# Patient Record
Sex: FEMALE | Race: WHITE | NOT HISPANIC OR LATINO | Employment: OTHER | URBAN - METROPOLITAN AREA
[De-identification: names, ages, dates, MRNs, and addresses within clinical notes are randomized per-mention and may not be internally consistent; named-entity substitution may affect disease eponyms.]

---

## 2018-08-08 ENCOUNTER — OFFICE VISIT (OUTPATIENT)
Dept: NEUROSURGERY | Facility: CLINIC | Age: 73
End: 2018-08-08

## 2018-08-08 VITALS — BODY MASS INDEX: 25.16 KG/M2 | HEIGHT: 65 IN | WEIGHT: 151 LBS | TEMPERATURE: 97.4 F

## 2018-08-08 DIAGNOSIS — M47.27 LUMBOSACRAL SPONDYLOSIS WITH RADICULOPATHY: Primary | ICD-10-CM

## 2018-08-08 DIAGNOSIS — M41.20 SCOLIOSIS (AND KYPHOSCOLIOSIS), IDIOPATHIC: ICD-10-CM

## 2018-08-08 DIAGNOSIS — M43.10 ACQUIRED SPONDYLOLISTHESIS: ICD-10-CM

## 2018-08-08 DIAGNOSIS — M48.061 SPINAL STENOSIS OF LUMBAR REGION WITHOUT NEUROGENIC CLAUDICATION: ICD-10-CM

## 2018-08-08 DIAGNOSIS — M51.36 DDD (DEGENERATIVE DISC DISEASE), LUMBAR: ICD-10-CM

## 2018-08-08 PROCEDURE — 99203 OFFICE O/P NEW LOW 30 MIN: CPT | Performed by: NEUROLOGICAL SURGERY

## 2018-08-08 RX ORDER — MIRABEGRON 50 MG/1
TABLET, FILM COATED, EXTENDED RELEASE ORAL
COMMUNITY
Start: 2018-08-02 | End: 2022-03-29

## 2018-08-08 RX ORDER — PREGABALIN 75 MG/1
75 CAPSULE ORAL
COMMUNITY

## 2018-08-08 RX ORDER — EXEMESTANE 25 MG/1
TABLET ORAL DAILY
COMMUNITY
End: 2022-03-29

## 2018-08-08 RX ORDER — ASPIRIN 325 MG
325 TABLET ORAL DAILY
COMMUNITY

## 2018-08-08 RX ORDER — METHOCARBAMOL 750 MG/1
TABLET, FILM COATED ORAL
COMMUNITY
Start: 2018-07-16

## 2018-08-08 RX ORDER — MONTELUKAST SODIUM 10 MG/1
TABLET ORAL
COMMUNITY
Start: 2018-06-04 | End: 2022-03-29

## 2018-08-08 RX ORDER — NAPROXEN 500 MG/1
TABLET ORAL
COMMUNITY
Start: 2018-07-31

## 2018-08-08 NOTE — PROGRESS NOTES
Patient: Yanely Hodge  : 1945    Primary Care Provider: Jah De Paz MD    Requesting Provider: As above        History    Chief Complaint: Low back and right anterior thigh pain.    History of Present Illness: Ms. Hodge is a 72-year-old retired  who has had chronic low back pain.  Her parents both had back problems.  About 5 years ago she saw Aden Cutler at the [facility] and he recommended nonoperative measures and started her on Lyrica which she used for a while.  In May her symptoms flared up once again.  She denies any specific inciting event.  The pain extends from her back into the right anterior thigh.  It does not extend below the knee.  She's better with walking or lying down.  Ice is helpful.  She is worse with sitting or standing.  She's been doing physical therapy and that has helped.  She's had 2 spinal injections and an SI block.  The first lumbar injection helped.  Other injections did not help.  She has no left leg symptoms.  She denies bowel or bladder dysfunction.  She is back on her Lyrica twice a day.  Too much of that is poorly tolerated.  She is also taking naproxen twice a day.  Overall her symptoms have improved somewhat.    Review of Systems   Constitutional: Negative for activity change, appetite change, chills, diaphoresis, fatigue, fever and unexpected weight change.   HENT: Negative for congestion, dental problem, drooling, ear discharge, ear pain, facial swelling, hearing loss, mouth sores, nosebleeds, postnasal drip, rhinorrhea, sinus pressure, sneezing, sore throat, tinnitus, trouble swallowing and voice change.    Eyes: Negative for photophobia, pain, discharge, redness, itching and visual disturbance.   Respiratory: Negative for apnea, cough, choking, chest tightness, shortness of breath, wheezing and stridor.    Cardiovascular: Negative for chest pain, palpitations and leg swelling.   Gastrointestinal: Negative for abdominal  "distention, abdominal pain, anal bleeding, blood in stool, constipation, diarrhea, nausea, rectal pain and vomiting.   Musculoskeletal: Positive for back pain and gait problem. Negative for arthralgias, joint swelling, myalgias, neck pain and neck stiffness.   Skin: Negative for color change, pallor, rash and wound.   Allergic/Immunologic: Positive for environmental allergies. Negative for food allergies and immunocompromised state.   Neurological: Positive for headaches. Negative for dizziness, tremors, seizures, syncope, facial asymmetry, speech difficulty, weakness, light-headedness and numbness.   Hematological: Negative for adenopathy. Does not bruise/bleed easily.   Psychiatric/Behavioral: Negative for agitation, behavioral problems, confusion, decreased concentration, dysphoric mood, self-injury, sleep disturbance and suicidal ideas. The patient is not nervous/anxious and is not hyperactive.        The patient's past medical history, past surgical history, family history, and social history have been reviewed at length in the electronic medical record.    Physical Exam:   Temp 97.4 °F (36.3 °C)   Ht 165.1 cm (65\")   Wt 68.5 kg (151 lb)   BMI 25.13 kg/m²   CONSTITUTIONAL: Patient is well-nourished, pleasant and appears stated age.  CV: Heart regular rate and rhythm without murmur, rub, or gallop.  PULMONARY: Lungs are clear to ascultation.  MUSCULOSKELETAL:  Straight leg raising is negative.  Clark's Sign is negative.  ROM in back is normal.  Tenderness in the back to palpation is not observed.  NEUROLOGICAL:  Orientation, memory, attention span, language function, and cognition have been examined and are intact.  Strength is intact in the lower extremities to direct testing.  Muscle tone is normal throughout.  Station and gait are normal.  Sensation is intact to light touch testing throughout.  Deep tendon reflexes are 1+ and symmetrical.  Coordination is intact.      Medical Decision Making    Data " Review:   MRI of the lumbar spine dated 7/24/18 demonstrates lumbar scoliosis as well as diffuse multilevel degenerative disc disease.  There is recess narrowing on the left at L2-3.  There is bilateral recess narrowing at L3-4.  There is generous stenosis at L4-5 where there is a grade 1 listhesis.    Diagnosis:   The patient harbors some mechanical low back pain.  She has a spondylolisthesis and deformity.  It's possible that she has some degree of radiculopathy.  Despite having radiographic stenosis she does not really describe symptoms consistent with neurogenic claudication.    Treatment Options:   The patient is improving and I suggested that she continue her medicine and her therapy.  I would like to give this time.  She will follow-up in my clinic in about 6 weeks to check on her progress.  She is avid rider and she will hold off on that for now.       Diagnosis Plan   1. Lumbosacral spondylosis with radiculopathy     2. Spinal stenosis of lumbar region without neurogenic claudication     3. DDD (degenerative disc disease), lumbar     4. Acquired spondylolisthesis     5. Scoliosis (and kyphoscoliosis), idiopathic             I, Dr. Betancourt, personally performed the services described in the documentation, as scribed in my presence, and it is both accurate and complete.  Scribed for Barry Betancourt MD by Hunter Talavera CMA on 08/08/2018 at 10:40 AM

## 2022-03-29 ENCOUNTER — OFFICE VISIT (OUTPATIENT)
Dept: NEUROSURGERY | Facility: CLINIC | Age: 77
End: 2022-03-29

## 2022-03-29 VITALS — BODY MASS INDEX: 24.86 KG/M2 | HEIGHT: 67 IN | TEMPERATURE: 98.3 F | WEIGHT: 158.4 LBS

## 2022-03-29 DIAGNOSIS — Q76.49 SPINAL DEFORMITY: ICD-10-CM

## 2022-03-29 DIAGNOSIS — M51.36 DDD (DEGENERATIVE DISC DISEASE), LUMBAR: ICD-10-CM

## 2022-03-29 DIAGNOSIS — M54.9 MECHANICAL BACK PAIN: Primary | ICD-10-CM

## 2022-03-29 PROCEDURE — 99204 OFFICE O/P NEW MOD 45 MIN: CPT | Performed by: NEUROLOGICAL SURGERY

## 2022-03-29 RX ORDER — PREGABALIN 50 MG/1
50 CAPSULE ORAL 3 TIMES DAILY
Qty: 30 CAPSULE | Refills: 0 | Status: SHIPPED | OUTPATIENT
Start: 2022-03-29

## 2022-03-29 RX ORDER — PRASTERONE (DHEA) 25 MG
CAPSULE ORAL
COMMUNITY

## 2022-03-29 RX ORDER — METHENAMINE HIPPURATE 1000 MG/1
TABLET ORAL EVERY 12 HOURS SCHEDULED
COMMUNITY

## 2022-03-29 RX ORDER — ESOMEPRAZOLE MAGNESIUM 40 MG/1
CAPSULE, DELAYED RELEASE ORAL
COMMUNITY
Start: 2022-03-06

## 2022-03-29 RX ORDER — MULTIVIT WITH MINERALS/LUTEIN
TABLET ORAL
COMMUNITY

## 2022-03-29 RX ORDER — KETOCONAZOLE 20 MG/ML
SHAMPOO TOPICAL
COMMUNITY

## 2022-03-29 RX ORDER — FLUTICASONE PROPIONATE 50 MCG
2 SPRAY, SUSPENSION (ML) NASAL DAILY
COMMUNITY
Start: 2022-01-17

## 2022-03-29 NOTE — PROGRESS NOTES
Patient: Yanely Hodge  : 1945    Primary Care Provider: Jah De Paz MD    Requesting Provider: As above        History    Chief Complaint: Right lower back pain.    History of Present Illness: Ms. Hodge is a 76-year-old retired  who is known to my service.  I last saw her in 2018 with back difficulties.  She has had chronic back issues.  Years ago she was seen at the [facility] by Aden Culter and nonoperative measures were recommended.  I also recommended nonoperative measures.  About 6 weeks ago she developed a flareup with right lower back pain and spasm.  She has been on Lyrica for 2 weeks and that has been very helpful.  She is also used Aleve and Tylenol.  She has no radicular leg symptoms.  She has a hard time tolerating the midday dose of Lyrica.  She is taking 100 mg twice a day at this point.    Review of Systems   Constitutional: Negative for activity change, appetite change, chills, diaphoresis, fatigue, fever and unexpected weight change.   HENT: Negative for congestion, dental problem, drooling, ear discharge, ear pain, facial swelling, hearing loss, mouth sores, nosebleeds, postnasal drip, rhinorrhea, sinus pressure, sinus pain, sneezing, sore throat, tinnitus, trouble swallowing and voice change.    Eyes: Negative for photophobia, pain, discharge, redness, itching and visual disturbance.   Respiratory: Negative for apnea, cough, choking, chest tightness, shortness of breath, wheezing and stridor.    Cardiovascular: Negative for chest pain, palpitations and leg swelling.   Gastrointestinal: Negative for abdominal distention, abdominal pain, anal bleeding, blood in stool, constipation, diarrhea, nausea, rectal pain and vomiting.   Endocrine: Negative for cold intolerance, heat intolerance, polydipsia, polyphagia and polyuria.   Genitourinary: Positive for pelvic pain. Negative for decreased urine volume, difficulty urinating, dyspareunia,  "dysuria, enuresis, flank pain, frequency, genital sores, hematuria, menstrual problem, urgency, vaginal bleeding, vaginal discharge and vaginal pain.   Musculoskeletal: Negative for arthralgias, back pain, gait problem, joint swelling, myalgias, neck pain and neck stiffness.   Skin: Negative for color change, pallor, rash and wound.   Allergic/Immunologic: Positive for food allergies. Negative for environmental allergies and immunocompromised state.   Neurological: Positive for headaches. Negative for dizziness, tremors, seizures, syncope, facial asymmetry, speech difficulty, weakness, light-headedness and numbness.   Hematological: Negative for adenopathy. Does not bruise/bleed easily.   Psychiatric/Behavioral: Negative for agitation, behavioral problems, confusion, decreased concentration, dysphoric mood, hallucinations, self-injury, sleep disturbance and suicidal ideas. The patient is not nervous/anxious and is not hyperactive.        The patient's past medical history, past surgical history, family history, and social history have been reviewed at length in the electronic medical record.    Physical Exam:   Temp 98.3 °F (36.8 °C) (Infrared)   Ht 168.9 cm (66.5\")   Wt 71.8 kg (158 lb 6.4 oz)   BMI 25.18 kg/m²   CONSTITUTIONAL: Patient is well-nourished, pleasant and appears stated age.  MUSCULOSKELETAL:  Straight leg raising is negative.  Clark's Sign is negative.  ROM in the low back is normal.  Tenderness in the back to palpation is not observed.  NEUROLOGICAL:  Orientation, memory, attention span, language function, and cognition have been examined and are intact.  Strength is intact in the lower extremities to direct testing.  Muscle tone is normal throughout.  Station and gait are normal.  Sensation is intact to light touch testing throughout.  Deep tendon reflexes are 2+ and symmetrical.  Coordination is intact.      Medical Decision Making    Data Review:   (All imaging studies were personally " reviewed unless stated otherwise)  MRI of the lumbar spine dated 3/2/2022 demonstrates profound scoliosis.  There is diffuse degenerative disc disease and facet arthropathy.  There is a grade 1 listhesis of L4 and L5 with generous grade stenosis.  There is a retrolisthesis of L1 on L2.  All of these findings were present previously.    Diagnosis:   Mechanical low back pain.    Treatment Options:   The patient is doing much better with Lyrica.  I prescribed a 50 mg pill to be used in the middle of the day.  Hopefully she can tolerate that better than the full 100 mg dose.  She had a pain clinic appointment and the patient I have discussed this and she is going to cancel it.  If symptoms worsen then we can always arrange that appointment once again.  I going to continue the Lyrica for a number of weeks or even a few months and then wean her off of that.       Diagnosis Plan   1. Mechanical back pain     2. Spinal deformity     3. DDD (degenerative disc disease), lumbar         Scribed for Barry Betancourt MD by Wanda Flores DEION 3/29/2022 11:49 EDT      I, Dr. Betancourt, personally performed the services described in the documentation, as scribed in my presence, and it is both accurate and complete.

## 2023-06-07 ENCOUNTER — OFFICE VISIT (OUTPATIENT)
Dept: ORTHOPEDIC SURGERY | Facility: CLINIC | Age: 78
End: 2023-06-07
Payer: MEDICARE

## 2023-06-07 VITALS
WEIGHT: 145 LBS | SYSTOLIC BLOOD PRESSURE: 134 MMHG | HEIGHT: 66 IN | BODY MASS INDEX: 23.3 KG/M2 | DIASTOLIC BLOOD PRESSURE: 72 MMHG

## 2023-06-07 DIAGNOSIS — M79.671 RIGHT FOOT PAIN: Primary | ICD-10-CM

## 2023-06-07 DIAGNOSIS — M25.871 SESAMOIDITIS OF RIGHT FOOT: ICD-10-CM

## 2023-06-07 RX ORDER — MULTIVIT-MIN/FOLIC/VIT K/LYCOP 400-300MCG
TABLET ORAL
COMMUNITY

## 2023-06-07 NOTE — PROGRESS NOTES
NEW PATIENT    Patient: Yanely Hodge  : 1945    Primary Care Provider: Jj Lamar MD    Requesting Provider: As above    Pain of the Right Foot      History    Chief Complaint: This right foot pain    History of Present Illness: This is a very pleasant 77-year-old woman who I have seen in the distant past.  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  Over the past 5 to 6 weeks she has developed right forefoot pain.  It is under the first metatarsal head.  In questioning her it seems to have started after she did physical therapy for her back and the therapist had her do toe raises.  She did not have any other injury.  She feels better with a padded [place] tennis shoe and much worse barefoot.  It can be 5 out of 10, much less at rest      The patient does not have a personal or family history of DVT, PE, hypercoagulable state or risk factors for clotting.        Current Outpatient Medications on File Prior to Visit   Medication Sig Dispense Refill    ascorbic acid (VITAMIN C) 1000 MG tablet Vitamin C      aspirin 325 MG tablet Take 1 tablet by mouth Daily.      Cholecalciferol (Vitamin D3) 25 MCG (1000 UT) capsule Vitamin D3      Cranberry, Vacc oxycoccus, (Cranberry Extract) 200 MG capsule cranberry      DHEA 25 MG capsule DHEA      esomeprazole (nexIUM) 40 MG capsule       fluticasone (FLONASE) 50 MCG/ACT nasal spray 2 sprays by Each Nare route Daily.      ketoconazole (NIZORAL) 2 % shampoo ketoconazole 2 % shampoo   Wash into the scalp, leave on for 1-2 minutes, then rinse out 2-3 times a week.      methenamine (HIPREX) 1 g tablet Every 12 (Twelve) Hours.      methocarbamol (ROBAXIN) 750 MG tablet       Multiple Vitamins-Minerals (VITAMINS/MINERALS PO) Take  by mouth.      naproxen (NAPROSYN) 500 MG tablet       pregabalin (LYRICA) 75 MG capsule Take 1 capsule by mouth every night at bedtime.      [DISCONTINUED] pregabalin (Lyrica) 50 MG capsule Take 1 capsule by mouth 3 (Three) Times a Day. 30  capsule 0     No current facility-administered medications on file prior to visit.      Allergies   Allergen Reactions    Oxycodone-Acetaminophen Itching and Unknown (See Comments)    Penicillins Hives, Swelling and Unknown (See Comments)    Isoflavones (Soy) Unknown (See Comments)      Past Medical History:   Diagnosis Date    Aneurysm     Arthritis     Arthritis of neck 2020    Cancer 2016    Breast    Fracture, foot One month ago    Not a fractiure but something wrong with the ball of my foor    GERD (gastroesophageal reflux disease)     Scoliosis 2004    Effects my neck     Past Surgical History:   Procedure Laterality Date    FEMUR FRACTURE SURGERY      MASTECTOMY      Left    REPAIR ANEURYSM / PSEUDO ANEURYSM / RUPTURED ANEURYSM POPLITEAL ARTERY      REPLACEMENT TOTAL KNEE Left     SHOULDER ARTHROSCOPY      SHOULDER SURGERY  2011    [facility]     Family History   Problem Relation Age of Onset    Osteoporosis Mother       Social History     Socioeconomic History    Marital status:    Tobacco Use    Smoking status: Never    Smokeless tobacco: Never   Vaping Use    Vaping Use: Never used   Substance and Sexual Activity    Alcohol use: Yes     Alcohol/week: 1.0 standard drink     Types: 1 Glasses of wine per week    Drug use: No    Sexual activity: Not Currently        Review of Systems   Constitutional:  Negative for activity change, appetite change, chills, diaphoresis, fatigue, fever and unexpected weight change.   HENT:  Negative for congestion, dental problem, drooling, ear discharge, ear pain, facial swelling, hearing loss, mouth sores, nosebleeds, postnasal drip, rhinorrhea, sinus pressure, sneezing, sore throat, tinnitus, trouble swallowing and voice change.    Eyes:  Negative for photophobia, pain, discharge, redness, itching and visual disturbance.   Respiratory:  Negative for apnea, cough, choking, chest tightness, shortness of breath, wheezing and stridor.    Cardiovascular:   "Negative for chest pain, palpitations and leg swelling.   Gastrointestinal:  Negative for abdominal distention, abdominal pain, anal bleeding, blood in stool, constipation, diarrhea, nausea, rectal pain and vomiting.   Endocrine: Negative for cold intolerance, heat intolerance, polydipsia, polyphagia and polyuria.   Genitourinary:  Negative for decreased urine volume, difficulty urinating, dysuria, enuresis, flank pain, frequency, genital sores, hematuria and urgency.   Musculoskeletal:  Positive for arthralgias. Negative for back pain, gait problem, joint swelling, myalgias, neck pain and neck stiffness.   Skin:  Negative for color change, pallor, rash and wound.   Allergic/Immunologic: Negative for environmental allergies, food allergies and immunocompromised state.   Neurological:  Negative for dizziness, tremors, seizures, syncope, facial asymmetry, speech difficulty, weakness, light-headedness, numbness and headaches.   Hematological:  Negative for adenopathy. Does not bruise/bleed easily.   Psychiatric/Behavioral:  Negative for agitation, behavioral problems, confusion, decreased concentration, dysphoric mood, hallucinations, self-injury, sleep disturbance and suicidal ideas. The patient is not nervous/anxious and is not hyperactive.      The following portions of the patient's history were reviewed and updated as appropriate: allergies, current medications, past family history, past medical history, past social history, past surgical history, and problem list.    Physical Exam:   /72   Ht 167.6 cm (65.98\")   Wt 65.8 kg (145 lb)   BMI 23.41 kg/m²   GENERAL: Body habitus: normal weight for height    Lower extremity edema: Right: none; Left: none    Varicose veins:  Right: none; Left: none    Gait: normal     Mental Status:  awake and alert; oriented to person, place, and time    Voice:  clear  SKIN:  Lower extremity: Normal    Hair Growth(lower extremity):  Right:normal; Left:  normal  NAILS: Toenails: " normal  HEENT: Head: Normocephalic, atraumatic,  without obvious abnormality.  eye: normal external eye, no icterus  ears:normal external ears  nose: normal external nose  PULM:  Repiratory effort normal  CV:  Dorsalis Pedis:  Right: 2+; Left:2+    Posterior Tibial: Right:2+; Left:2+    Capillary Refill:  Brisk  MSK:  Hand:mild arthritis, Heberden's nodes      Tibia:  Right:  non tender; Left:  non tender      Ankle:  Right: non tender; Left:  non tender      Foot:  Right:   Thin atrophic fat pad on the plantar foot, tender under the medial and lateral sesamoid more so under the medial sesamoid, 40 degree dorsiflexion plantarflexion of the great toe, otherwise nontender ; Left:   Thin fat pad no tenderness      NEURO: Heel Walking:  Right:  normal; Left:  normal    Toe Walking:  Right:  normal; Left:  normal     [place] 5.07 monofilament test: normal    Lower extremity sensation: intact     Motor Function: all 5/5         Medical Decision Making    Data Review:   ordered and reviewed x-rays today    Assessment and Plan/ Diagnosis/Treatment options:   1. Sesamoiditis of right foot  This is a sesamoid.  I explained sesamoiditis to her.  It was probably triggered by doing the toe raises on the foot that has very thin fat pad.  I explained the sesamoid x-rays.  She does have a small protrusion on the medial sesamoid but I explained that its been there for many years, I do not think it is a individual sharp pressure-point causing the problem.  I think both sesamoids are overloaded.  I showed her how to use a dancers pad in her shoe and gave her prescription for custom 1.  We talked about injection but I would not recommend that at this time.  Given its been of short duration I think this will improve with the dancers padding and time.  If it does not then I will consider injection.  I will see her again in 3 months.  I advised her not to do the toe raises any longer.            Part of this encounter note is an  electronic transcription/translation of spoken language to printed text. The electronic translation of spoken language may permit erroneous, or at times, nonsensical words or phrases to be inadvertently transcribed; Although I have reviewed the note for such errors, some may still exist.    NOTE TO PATIENT: The 21st Century Cures Act makes medical notes like these available to patients in the interest of transparency. However, be advised this is a medical document. It is intended as peer to peer communication. It is written in medical language and may contain abbreviations or verbiage that are unfamiliar. It may appear blunt or direct. Medical documents are intended to carry relevant information, facts as evident, and the clinical opinion of the practitioner.       Geneva Goff MD

## 2023-09-08 ENCOUNTER — OFFICE VISIT (OUTPATIENT)
Dept: ORTHOPEDIC SURGERY | Facility: CLINIC | Age: 78
End: 2023-09-08
Payer: MEDICARE

## 2023-09-08 VITALS
HEIGHT: 65 IN | WEIGHT: 146.2 LBS | DIASTOLIC BLOOD PRESSURE: 82 MMHG | SYSTOLIC BLOOD PRESSURE: 138 MMHG | BODY MASS INDEX: 24.36 KG/M2

## 2023-09-08 DIAGNOSIS — M25.871 SESAMOIDITIS OF RIGHT FOOT: Primary | ICD-10-CM

## 2023-09-08 PROCEDURE — 99213 OFFICE O/P EST LOW 20 MIN: CPT | Performed by: ORTHOPAEDIC SURGERY

## 2023-09-08 RX ORDER — CLOBETASOL PROPIONATE 0.46 MG/ML
SOLUTION TOPICAL
COMMUNITY
Start: 2023-07-10

## 2023-09-08 NOTE — PROGRESS NOTES
ESTABLISHED PATIENT    Patient: Yanely Hodge  : 1945    Primary Care Provider: Jj Lamar MD    Requesting Provider: As above    Follow-up (3 month f/u -- Sesamoiditis of right foot)      History    Chief Complaint: Right foot sesamoiditis    History of Present Illness: She returns for follow-up of her right foot sesamoiditis.  Unfortunately she reports it really has not changed.  She did get the custom orthotics but did not bring them with her.  She got more dancers pad but unfortunately she has not put it in the correct place in her current shoe.  I took the old one out and put a new one in for her and showed her where it is supposed to go.  She also now has a thick callus under the medial sesamoid.  We discussed callus care in detail.    Current Outpatient Medications on File Prior to Visit   Medication Sig Dispense Refill    ascorbic acid (VITAMIN C) 1000 MG tablet Vitamin C      aspirin 325 MG tablet Take 1 tablet by mouth Daily.      Cholecalciferol (Vitamin D3) 25 MCG (1000 UT) capsule Vitamin D3      clobetasol (TEMOVATE) 0.05 % external solution APPLY TOPICALLY TO THE AFFECTED AREA ON THE SCALP TWICE DAILY FOR 14 DAYS      Cranberry, Vacc oxycoccus, (Cranberry Extract) 200 MG capsule cranberry      DHEA 25 MG capsule DHEA      esomeprazole (nexIUM) 40 MG capsule       fluticasone (FLONASE) 50 MCG/ACT nasal spray 2 sprays by Each Nare route Daily.      ketoconazole (NIZORAL) 2 % shampoo ketoconazole 2 % shampoo   Wash into the scalp, leave on for 1-2 minutes, then rinse out 2-3 times a week.      methenamine (HIPREX) 1 g tablet Every 12 (Twelve) Hours.      methocarbamol (ROBAXIN) 750 MG tablet       Multiple Vitamins-Minerals (VITAMINS/MINERALS PO) Take  by mouth.      naproxen (NAPROSYN) 500 MG tablet       pregabalin (LYRICA) 75 MG capsule Take 1 capsule by mouth every night at bedtime.       No current facility-administered medications on file prior to visit.      Allergies   Allergen  Reactions    Oxycodone-Acetaminophen Itching and Unknown (See Comments)    Penicillins Hives, Swelling and Unknown (See Comments)    Isoflavones (Soy) Unknown (See Comments)      Past Medical History:   Diagnosis Date    Aneurysm     Arthritis     Arthritis of back     Arthritis of neck 2020    Cancer 2016    Breast    Fracture, femur     Surgery    Fracture, foot One month ago    Not a fractiure but something wrong with the ball of my foor    GERD (gastroesophageal reflux disease)     Knee swelling     Knee replacement 2018    Scoliosis 2004    Effects my neck     Past Surgical History:   Procedure Laterality Date    FEMUR FRACTURE SURGERY      MASTECTOMY      Left    REPAIR ANEURYSM / PSEUDO ANEURYSM / RUPTURED ANEURYSM POPLITEAL ARTERY      REPLACEMENT TOTAL KNEE Left     SHOULDER ARTHROSCOPY      SHOULDER SURGERY  2011    Repaced at [facility]     Family History   Problem Relation Age of Onset    Osteoporosis Mother       Social History     Socioeconomic History    Marital status:    Tobacco Use    Smoking status: Never    Smokeless tobacco: Never   Vaping Use    Vaping Use: Never used   Substance and Sexual Activity    Alcohol use: Yes     Alcohol/week: 1.0 standard drink     Types: 1 Glasses of wine per week    Drug use: No    Sexual activity: Not Currently        Review of Systems   Constitutional:  Negative for activity change, appetite change, chills, diaphoresis, fatigue, fever and unexpected weight change.   HENT:  Negative for congestion, dental problem, drooling, ear discharge, ear pain, facial swelling, hearing loss, mouth sores, nosebleeds, postnasal drip, rhinorrhea, sinus pressure, sneezing, sore throat, tinnitus, trouble swallowing and voice change.    Eyes:  Negative for photophobia, pain, discharge, redness, itching and visual disturbance.   Respiratory:  Negative for apnea, cough, choking, chest tightness, shortness of breath, wheezing and stridor.    Cardiovascular:  Negative for  "chest pain, palpitations and leg swelling.   Gastrointestinal:  Negative for abdominal distention, abdominal pain, anal bleeding, blood in stool, constipation, diarrhea, nausea, rectal pain and vomiting.   Endocrine: Negative for cold intolerance, heat intolerance, polydipsia, polyphagia and polyuria.   Genitourinary:  Negative for decreased urine volume, difficulty urinating, dysuria, enuresis, flank pain, frequency, genital sores, hematuria and urgency.   Musculoskeletal:  Positive for arthralgias. Negative for back pain, gait problem, joint swelling, myalgias, neck pain and neck stiffness.   Skin:  Negative for color change, pallor, rash and wound.   Allergic/Immunologic: Negative for environmental allergies, food allergies and immunocompromised state.   Neurological:  Negative for dizziness, tremors, seizures, syncope, facial asymmetry, speech difficulty, weakness, light-headedness, numbness and headaches.   Hematological:  Negative for adenopathy. Does not bruise/bleed easily.   Psychiatric/Behavioral:  Negative for agitation, behavioral problems, confusion, decreased concentration, dysphoric mood, hallucinations, self-injury, sleep disturbance and suicidal ideas. The patient is not nervous/anxious and is not hyperactive.      The following portions of the patient's history were reviewed and updated as appropriate: allergies, current medications, past family history, past medical history, past social history, past surgical history, and problem list.    Physical Exam:   /82   Ht 165.1 cm (65\")   Wt 66.3 kg (146 lb 3.2 oz)   BMI 24.33 kg/m²   GENERAL: Body habitus: normal weight for height  Tender under the right sesamoids especially the medial sesamoid, there is now a thick pointed callus under the medial sesamoid, I removed it for her to get her started    Medical Decision Making    Data Review:   none    Assessment/Plan/Diagnosis/Treatment Options:   1. Sesamoiditis of right foot  Persistent " sesamoiditis.  She did stop the exercise going up on her tiptoes, but she still having pain.  She also now has a thick callus.  I remove the callus.  We talked about callus care every day.  I replaced the dancers pad in her current shoe.  I am going to recommend physical therapy.  We are going to try an injection.  Return in 4 months, x-ray  of the sesamoids at that time if she has not improved there is  - Ambulatory Referral to Physical Therapy Evaluate and treat, Ortho      After discussing the risks (including infection, skin atrophy, etc), time out was called,  the right 1st MTPJ was prepped and using sterile technique injected with 0.5cc of 1% lidocaine and 1cc (40mg) triamcinolone.  A band aid was applied.  No complications.       Geneva Goff MD

## 2024-01-12 ENCOUNTER — OFFICE VISIT (OUTPATIENT)
Dept: ORTHOPEDIC SURGERY | Facility: CLINIC | Age: 79
End: 2024-01-12
Payer: COMMERCIAL

## 2024-01-12 VITALS
DIASTOLIC BLOOD PRESSURE: 84 MMHG | HEIGHT: 65 IN | WEIGHT: 149 LBS | BODY MASS INDEX: 24.83 KG/M2 | SYSTOLIC BLOOD PRESSURE: 128 MMHG

## 2024-01-12 DIAGNOSIS — M25.871 SESAMOIDITIS OF RIGHT FOOT: Primary | ICD-10-CM

## 2024-01-12 PROCEDURE — 99212 OFFICE O/P EST SF 10 MIN: CPT | Performed by: ORTHOPAEDIC SURGERY

## 2024-01-12 RX ORDER — ROPINIROLE 0.25 MG/1
TABLET, FILM COATED ORAL
COMMUNITY
Start: 2023-11-02

## 2024-01-12 NOTE — PROGRESS NOTES
ESTABLISHED PATIENT    Patient: Yanely Hodge  : 1945    Primary Care Provider: Jj Lamar MD    Requesting Provider: As above    Follow-up (4 month recheck- Sesamoiditis of right foot)      History    Chief Complaint: Right foot pain    History of Present Illness: She returns for follow-up of her right sesamoiditis.  She reports she is doing much better.  She is wearing the orthotics with a dancers padding.  She is taking care of her callus.  The injection helped and therapy has helped    Current Outpatient Medications on File Prior to Visit   Medication Sig Dispense Refill    aspirin 325 MG tablet Take 1 tablet by mouth Daily.      Cholecalciferol (Vitamin D3) 25 MCG (1000 UT) capsule Vitamin D3      clobetasol (TEMOVATE) 0.05 % external solution APPLY TOPICALLY TO THE AFFECTED AREA ON THE SCALP TWICE DAILY FOR 14 DAYS      Cranberry, Vacc oxycoccus, (Cranberry Extract) 200 MG capsule cranberry      DHEA 25 MG capsule DHEA      esomeprazole (nexIUM) 40 MG capsule       fluticasone (FLONASE) 50 MCG/ACT nasal spray 2 sprays by Each Nare route Daily.      ketoconazole (NIZORAL) 2 % shampoo ketoconazole 2 % shampoo   Wash into the scalp, leave on for 1-2 minutes, then rinse out 2-3 times a week.      methenamine (HIPREX) 1 g tablet Every 12 (Twelve) Hours.      methocarbamol (ROBAXIN) 750 MG tablet       Multiple Vitamins-Minerals (VITAMINS/MINERALS PO) Take  by mouth.      naproxen (NAPROSYN) 500 MG tablet       pregabalin (LYRICA) 75 MG capsule Take 1 capsule by mouth every night at bedtime.      rOPINIRole (REQUIP) 0.25 MG tablet TAKE 1/2 TO 1 TABLET BY MOUTH EVERY DAY 1 TO 3 HOURS BEFORE BEDTIME      ascorbic acid (VITAMIN C) 1000 MG tablet Vitamin C (Patient not taking: Reported on 2024)       No current facility-administered medications on file prior to visit.      Allergies   Allergen Reactions    Oxycodone-Acetaminophen Itching and Unknown (See Comments)    Penicillins Hives, Swelling  "and Unknown (See Comments)    Isoflavones (Soy) Unknown (See Comments)      Past Medical History:   Diagnosis Date    Aneurysm     Arthritis     Arthritis of back     Arthritis of neck 2020    Cancer 2016    Breast    Fracture, femur     Surgery    Fracture, foot One month ago    Not a fractiure but something wrong with the ball of my foor    GERD (gastroesophageal reflux disease)     Knee swelling     Knee replacement 2018    Scoliosis 2004    Effects my neck     Past Surgical History:   Procedure Laterality Date    FEMUR FRACTURE SURGERY      MASTECTOMY      Left    REPAIR ANEURYSM / PSEUDO ANEURYSM / RUPTURED ANEURYSM POPLITEAL ARTERY      REPLACEMENT TOTAL KNEE Left     SHOULDER ARTHROSCOPY      SHOULDER SURGERY  2011    Repaced at [facility]     Family History   Problem Relation Age of Onset    Osteoporosis Mother       Social History     Socioeconomic History    Marital status:    Tobacco Use    Smoking status: Never    Smokeless tobacco: Never   Vaping Use    Vaping Use: Never used   Substance and Sexual Activity    Alcohol use: Yes     Alcohol/week: 1.0 standard drink of alcohol     Types: 1 Glasses of wine per week    Drug use: No    Sexual activity: Not Currently        Review of Systems   Constitutional: Negative.    HENT: Negative.     Eyes: Negative.    Respiratory: Negative.     Cardiovascular: Negative.    Gastrointestinal: Negative.    Endocrine: Negative.    Genitourinary: Negative.    Musculoskeletal:  Positive for arthralgias.   Skin: Negative.    Allergic/Immunologic: Negative.    Neurological: Negative.    Hematological: Negative.    Psychiatric/Behavioral: Negative.         The following portions of the patient's history were reviewed and updated as appropriate: allergies, current medications, past family history, past medical history, past social history, past surgical history, and problem list.    Physical Exam:   /84   Ht 165.1 cm (65\")   Wt 67.6 kg (149 lb)   BMI " 24.79 kg/m²   Right foot has no significant callusing, nontender, no change in the thin fat pad    Medical Decision Making    Data Review:   none    Assessment/Plan/Diagnosis/Treatment Options:   1. Sesamoiditis of right foot  She is much improved.  I recommend continue with this maintenance.  If it flares again she will return for injection          Geneva Goff MD

## 2025-01-15 ENCOUNTER — OFFICE VISIT (OUTPATIENT)
Dept: ORTHOPEDIC SURGERY | Facility: CLINIC | Age: 80
End: 2025-01-15
Payer: MEDICARE

## 2025-01-15 VITALS
WEIGHT: 145.6 LBS | BODY MASS INDEX: 24.26 KG/M2 | SYSTOLIC BLOOD PRESSURE: 122 MMHG | HEIGHT: 65 IN | DIASTOLIC BLOOD PRESSURE: 68 MMHG

## 2025-01-15 DIAGNOSIS — M79.672 LEFT FOOT PAIN: Primary | ICD-10-CM

## 2025-01-15 PROCEDURE — 99214 OFFICE O/P EST MOD 30 MIN: CPT | Performed by: ORTHOPAEDIC SURGERY

## 2025-01-15 PROCEDURE — 1159F MED LIST DOCD IN RCRD: CPT | Performed by: ORTHOPAEDIC SURGERY

## 2025-01-15 PROCEDURE — 1160F RVW MEDS BY RX/DR IN RCRD: CPT | Performed by: ORTHOPAEDIC SURGERY

## 2025-01-15 RX ORDER — OMEPRAZOLE 40 MG/1
1 CAPSULE, DELAYED RELEASE ORAL
COMMUNITY
Start: 2025-01-15

## 2025-01-15 RX ORDER — SENNOSIDES 8.6 MG
CAPSULE ORAL
COMMUNITY

## 2025-01-15 RX ORDER — SODIUM PHOSPHATE,MONO-DIBASIC 19G-7G/118
ENEMA (ML) RECTAL EVERY 12 HOURS SCHEDULED
COMMUNITY

## 2025-01-15 RX ORDER — CETIRIZINE HYDROCHLORIDE 10 MG/1
TABLET ORAL EVERY 24 HOURS
COMMUNITY

## 2025-01-15 RX ORDER — ADHESIVE TAPE 3"X 2.3 YD
TAPE, NON-MEDICATED TOPICAL
COMMUNITY

## 2025-01-15 RX ORDER — SAW PALMETTO 160 MG
CAPSULE ORAL EVERY 24 HOURS
COMMUNITY

## 2025-01-15 RX ORDER — NAPROXEN SODIUM 220 MG/1
TABLET, FILM COATED ORAL
COMMUNITY

## 2025-01-15 RX ORDER — OLOPATADINE HYDROCHLORIDE 7 MG/ML
SOLUTION OPHTHALMIC
COMMUNITY

## 2025-01-15 NOTE — PROGRESS NOTES
NEW PATIENT    Patient: Yanely Hodge  : 1945    Primary Care Provider: Jj Lamar MD    Requesting Provider: As above    Pain and Initial Evaluation of the Left Foot (Great toe pain)      History    Chief Complaint: Left foot pain    History of Present Illness: This is a very pleasant 79-year-old woman with a new problem.  Sometime around September or 2024 she did a lot of walking in some different shoes and developed ecchymosis under the great toenails on both sides.  The right 1 does not hurt the top left 1 has been persistently painful.  It is painful when anything pushes on it.  She then had a right total knee done 2024.  She notes that that went well, but she still having trouble with the left great toe pain.  No history of stroke that she is aware of, she does have a long history of back problems.  She thinks she see neurology certain who she saw.  I did look back through her chart and she did see Dr. Betancourt of neurosurgery in 2018.  I cannot find any EMG/NCV's in the chart.  She has been to the [facility] about her back in the past.  She has not had any symptoms of stroke or any neurologic event that she is aware of.  No numbness, she has not noted any particular weakness.  She has not really noticed that when she walks the left great toe dorsiflexes at the DIP joint much more than the right.  She reports that when something pushes on the left great toe it pain is 7 out of 10.  She has not had any signs of infection.      The patient does not have a personal or family history of DVT, PE, hypercoagulable state or risk factors for clotting.        Current Outpatient Medications on File Prior to Visit   Medication Sig Dispense Refill    acetaminophen (Tylenol 8 Hour Arthritis Pain) 650 MG 8 hr tablet 1 tablet as needed Orally every 8 hrs OTC      ascorbic acid (VITAMIN C) 1000 MG tablet       aspirin 325 MG tablet Take 1 tablet by mouth Daily.      cetirizine (ZyrTEC  Allergy) 10 MG tablet Daily.      Cholecalciferol (Vitamin D3) 25 MCG (1000 UT) capsule Vitamin D3      clobetasol (TEMOVATE) 0.05 % external solution APPLY TOPICALLY TO THE AFFECTED AREA ON THE SCALP TWICE DAILY FOR 14 DAYS      coenzyme Q10 (Q-Sorb Co Q-10) 100 MG capsule Daily.      Cranberry 500 MG capsule Take  by mouth.      Cranberry, Vacc oxycoccus, (Cranberry Extract) 200 MG capsule cranberry      DHEA 25 MG capsule DHEA      esomeprazole (nexIUM) 40 MG capsule       fluticasone (FLONASE) 50 MCG/ACT nasal spray 2 sprays by Each Nare route Daily.      Garlic Oil 1000 MG capsule 1 capsule.      glucosamine-chondroitin 500-400 MG capsule capsule Every 12 (Twelve) Hours.      ketoconazole (NIZORAL) 2 % shampoo ketoconazole 2 % shampoo   Wash into the scalp, leave on for 1-2 minutes, then rinse out 2-3 times a week.      methenamine (HIPREX) 1 g tablet Every 12 (Twelve) Hours.      methocarbamol (ROBAXIN) 750 MG tablet       Multiple Vitamins-Minerals (VITAMINS/MINERALS PO) Take  by mouth.      naproxen (NAPROSYN) 500 MG tablet       naproxen sodium (Aleve) 220 MG tablet 1 tablet as needed Orally every 12 hrs as needed OTC      Olopatadine HCl (Pazeo) 0.7 % solution Ophthalmic      omeprazole (priLOSEC) 40 MG capsule 1 capsule.      Polyethylene Glycol 3350 (MIRALAX PO) Daily.      pregabalin (LYRICA) 75 MG capsule Take 1 capsule by mouth every night at bedtime.      rOPINIRole (REQUIP) 0.25 MG tablet TAKE 1/2 TO 1 TABLET BY MOUTH EVERY DAY 1 TO 3 HOURS BEFORE BEDTIME       No current facility-administered medications on file prior to visit.      Allergies   Allergen Reactions    Oxycodone-Acetaminophen Itching and Unknown (See Comments)    Penicillins Hives, Swelling and Unknown (See Comments)    Isoflavones (Soy) Unknown (See Comments)      Past Medical History:   Diagnosis Date    Aneurysm     Arthritis     Arthritis of back     Arthritis of neck 2020    Cancer 2016    Breast    Fracture, femur     Surgery     Fracture, foot One month ago    Not a fractiure but something wrong with the ball of my foor    GERD (gastroesophageal reflux disease)     Knee swelling     Knee replacement 2018    Scoliosis 2004    Effects my neck     Past Surgical History:   Procedure Laterality Date    FEMUR FRACTURE SURGERY      MASTECTOMY      Left    REPAIR ANEURYSM / PSEUDO ANEURYSM / RUPTURED ANEURYSM POPLITEAL ARTERY      REPLACEMENT TOTAL KNEE Left     SHOULDER ARTHROSCOPY      SHOULDER SURGERY  2011    Repaced at [facility]     Family History   Problem Relation Age of Onset    Osteoporosis Mother       Social History     Socioeconomic History    Marital status:    Tobacco Use    Smoking status: Never    Smokeless tobacco: Never   Vaping Use    Vaping status: Never Used   Substance and Sexual Activity    Alcohol use: Yes     Alcohol/week: 1.0 standard drink of alcohol     Types: 1 Glasses of wine per week    Drug use: No    Sexual activity: Not Currently        Review of Systems   Constitutional:  Negative for activity change, appetite change, chills, diaphoresis, fatigue, fever and unexpected weight change.   HENT:  Negative for congestion, dental problem, drooling, ear discharge, ear pain, facial swelling, hearing loss, mouth sores, nosebleeds, postnasal drip, rhinorrhea, sinus pressure, sneezing, sore throat, tinnitus, trouble swallowing and voice change.    Eyes:  Negative for photophobia, pain, discharge, redness, itching and visual disturbance.   Respiratory:  Negative for apnea, cough, choking, chest tightness, shortness of breath, wheezing and stridor.    Cardiovascular:  Negative for chest pain, palpitations and leg swelling.   Gastrointestinal:  Negative for abdominal distention, abdominal pain, anal bleeding, blood in stool, constipation, diarrhea, nausea, rectal pain and vomiting.   Endocrine: Negative for cold intolerance, heat intolerance, polydipsia, polyphagia and polyuria.   Genitourinary:  Negative for  "decreased urine volume, difficulty urinating, dysuria, enuresis, flank pain, frequency, genital sores, hematuria and urgency.   Musculoskeletal:  Positive for arthralgias. Negative for back pain, gait problem, joint swelling, myalgias, neck pain and neck stiffness.   Skin:  Negative for color change, pallor, rash and wound.   Allergic/Immunologic: Negative for environmental allergies, food allergies and immunocompromised state.   Neurological:  Negative for dizziness, tremors, seizures, syncope, facial asymmetry, speech difficulty, weakness, light-headedness, numbness and headaches.   Hematological:  Negative for adenopathy. Does not bruise/bleed easily.   Psychiatric/Behavioral:  Negative for agitation, behavioral problems, confusion, decreased concentration, dysphoric mood, hallucinations, self-injury, sleep disturbance and suicidal ideas. The patient is not nervous/anxious and is not hyperactive.        The following portions of the patient's history were reviewed and updated as appropriate: allergies, current medications, past family history, past medical history, past social history, past surgical history, and problem list.    Physical Exam:   /68   Ht 165.1 cm (65\")   Wt 66 kg (145 lb 9.6 oz)   BMI 24.23 kg/m²   GENERAL: Body habitus: normal weight for height    Lower extremity edema: Right: none; Left: none    Varicose veins:  Right: mild; Left: mild    Gait:  As she walks, there is no antalgia, but she walks with hyper dorsiflexion of the left great toe at the DIP joint, even in stance she tends to sit with the dorsiflexed.  It easily relaxes when pointed out to her, but routinely it sits dorsiflexed     Mental Status:  awake and alert; oriented to person, place, and time    Voice:  clear  SKIN:  Lower extremity: warm and dry    Hair Growth(lower extremity):  Right:normal; Left:  normal  NAILS: Toenails:  There is ecchymosis under both great toenails, it is old, it is extensive on the right and " consists of about one half under the left great toenail, there is no signs of ingrown toenail  HEENT: Head: Normocephalic, atraumatic,  without obvious abnormality.  eye: normal external eye, no icterus  ears:normal external ears  nose: normal external nose  PULM:  Repiratory effort normal  CV:  Dorsalis Pedis:  Right: 2+; Left:2+    Posterior Tibial: Right:2+; Left:2+    Capillary Refill:  Brisk  MSK:  Hand:moderate arthritis      Tibia:  Right:  non tender; Left:  non tender      Ankle:  Right: non tender; Left:  non tender      Foot:  Right:   Quite extensive old ecchymosis under the right great toenail, but nontender, range of motion and motor function are intact ; Left:   About 1Sitting on the exam table at rest she tends to sit with the left great toe dorsiflexed at the DIP joint, there is no other tenderness, the EHL and FHL have normal function to gross motor testing, I think that subjectively I can identify a very subtle slight weakness in the peroneals and the anterior tib on the left compared with the right, there is no instability, she has a slightly positive Tinel's over the base of the second metatarsal and the deep peroneal nerve but otherwise negative Tinel's, sensation is intact circumferentially to light touch in the [place] Darryl fiber, calf circumference is not reliable because of the recent right total knee replacement, comparison would not be accurate      NEURO: Heel Walking:  Right:  normal; Left:   She is very subtly less able to dorsiflex and keep the left foot dorsiflexed walking on her heels on this side    Toe Walking:  Right:  normal; Left:  normal            Medical Decision Making    Data Review:   ordered and reviewed x-rays today and reviewed outside records    Assessment and Plan/ Diagnosis/Treatment options:   1. Left foot pain  I think she has a very subtle weakness on the left side.  I think that is what is causing her great toe to dorsiflex at the DIP joint.  It may be a  very subtle foot drop.  We talked about referring her to neurology but she would like to try something simpler first.  I think physical therapy may be very helpful.  It is possible that she got into this pattern when rehabbing the right knee, it is possible it may be related to her back problems, I do not find any numbness, no specific Tinel's, it is clearly not a severe muscle weakness.  We will try physical therapy first.  If that does not help enough I will refer her to neurology.  - XR Foot 2 View Left  - Ambulatory Referral to Physical Therapy for Evaluation & Treatment            Part of this encounter note is an electronic transcription/translation of spoken language to printed text. The electronic translation of spoken language may permit erroneous, or at times, nonsensical words or phrases to be inadvertently transcribed; Although I have reviewed the note for such errors, some may still exist.    NOTE TO PATIENT: The 21st Century Cures Act makes medical notes like these available to patients in the interest of transparency. However, be advised this is a medical document. It is intended as peer to peer communication. It is written in medical language and may contain abbreviations or verbiage that are unfamiliar. It may appear blunt or direct. Medical documents are intended to carry relevant information, facts as evident, and the clinical opinion of the practitioner.       Geneva Goff MD

## 2025-03-13 ENCOUNTER — TELEPHONE (OUTPATIENT)
Dept: ORTHOPEDIC SURGERY | Facility: CLINIC | Age: 80
End: 2025-03-13

## 2025-03-13 NOTE — TELEPHONE ENCOUNTER
Caller: ALONDRA THRONTON    Relationship: PATIENT    Best call back number: [phone]    What form or medical record are you requesting:  SEE REFERRAL FOR PT ON 1/15/25. PATIENT SAYS DR. SCHWAB SAID OFFICE WOULD FAX OVER EXERCISES FOR HER LEFT GREAT TOE TO PHYSICAL THERAPY. PLEASE FAX AGAIN.    Who is requesting this form or medical record from you: PT    How would you like to receive the form or medical records (pick-up, mail, fax): FAX  If fax, what is the fax number: [phone] ATTN: TERRANCE MONCADA

## 2025-03-31 ENCOUNTER — OFFICE VISIT (OUTPATIENT)
Dept: ORTHOPEDIC SURGERY | Facility: CLINIC | Age: 80
End: 2025-03-31
Payer: MEDICARE

## 2025-03-31 VITALS
BODY MASS INDEX: 24.03 KG/M2 | WEIGHT: 144.2 LBS | SYSTOLIC BLOOD PRESSURE: 118 MMHG | DIASTOLIC BLOOD PRESSURE: 62 MMHG | HEIGHT: 65 IN

## 2025-03-31 DIAGNOSIS — M25.871 SESAMOIDITIS OF RIGHT FOOT: ICD-10-CM

## 2025-03-31 DIAGNOSIS — M79.672 LEFT FOOT PAIN: Primary | ICD-10-CM

## 2025-03-31 PROCEDURE — 99213 OFFICE O/P EST LOW 20 MIN: CPT | Performed by: ORTHOPAEDIC SURGERY

## 2025-03-31 PROCEDURE — 1159F MED LIST DOCD IN RCRD: CPT | Performed by: ORTHOPAEDIC SURGERY

## 2025-03-31 PROCEDURE — 1160F RVW MEDS BY RX/DR IN RCRD: CPT | Performed by: ORTHOPAEDIC SURGERY

## 2025-03-31 NOTE — PROGRESS NOTES
ESTABLISHED PATIENT    Patient: Yanely Hodge  : 1945    Primary Care Provider: Jj Lamar MD    Requesting Provider: As above    Follow-up (2.5 month f/u; Left foot pain)      History    Chief Complaint: Left foot pain, right foot pain    History of Present Illness: She returns for follow-up of her right sesamoiditis and left great toe pain.  Through some mixup she never quite got to physical therapy.  She was expecting the exercises.  She has noted that my assessment of the great toe dorsiflexing when she walks is correct.    Current Outpatient Medications on File Prior to Visit   Medication Sig Dispense Refill    acetaminophen (Tylenol 8 Hour Arthritis Pain) 650 MG 8 hr tablet 1 tablet as needed Orally every 8 hrs OTC      ascorbic acid (VITAMIN C) 1000 MG tablet       aspirin 325 MG tablet Take 1 tablet by mouth Daily.      cetirizine (ZyrTEC Allergy) 10 MG tablet Daily.      Cholecalciferol (Vitamin D3) 25 MCG (1000 UT) capsule Vitamin D3      clobetasol (TEMOVATE) 0.05 % external solution APPLY TOPICALLY TO THE AFFECTED AREA ON THE SCALP TWICE DAILY FOR 14 DAYS      coenzyme Q10 (Q-Sorb Co Q-10) 100 MG capsule Daily.      Cranberry 500 MG capsule Take  by mouth.      Cranberry, Vacc oxycoccus, (Cranberry Extract) 200 MG capsule cranberry      DHEA 25 MG capsule DHEA      esomeprazole (nexIUM) 40 MG capsule       fluticasone (FLONASE) 50 MCG/ACT nasal spray 2 sprays by Each Nare route Daily.      Garlic Oil 1000 MG capsule 1 capsule.      glucosamine-chondroitin 500-400 MG capsule capsule Every 12 (Twelve) Hours.      ketoconazole (NIZORAL) 2 % shampoo ketoconazole 2 % shampoo   Wash into the scalp, leave on for 1-2 minutes, then rinse out 2-3 times a week.      methenamine (HIPREX) 1 g tablet Every 12 (Twelve) Hours.      methocarbamol (ROBAXIN) 750 MG tablet       Multiple Vitamins-Minerals (VITAMINS/MINERALS PO) Take  by mouth.      naproxen (NAPROSYN) 500 MG tablet       naproxen sodium  (Aleve) 220 MG tablet 1 tablet as needed Orally every 12 hrs as needed OTC      Olopatadine HCl (Pazeo) 0.7 % solution Ophthalmic      omeprazole (priLOSEC) 40 MG capsule 1 capsule.      Polyethylene Glycol 3350 (MIRALAX PO) Daily.      pregabalin (LYRICA) 75 MG capsule Take 1 capsule by mouth every night at bedtime.      rOPINIRole (REQUIP) 0.25 MG tablet TAKE 1/2 TO 1 TABLET BY MOUTH EVERY DAY 1 TO 3 HOURS BEFORE BEDTIME       No current facility-administered medications on file prior to visit.      Allergies   Allergen Reactions    Oxycodone-Acetaminophen Itching and Unknown (See Comments)    Penicillins Hives, Swelling and Unknown (See Comments)    Isoflavones (Soy) Unknown (See Comments)      Past Medical History:   Diagnosis Date    Aneurysm     Arthritis     Arthritis of back     Arthritis of neck 2020    Cancer 2016    Breast    Fracture, femur     Surgery    Fracture, foot One month ago    Not a fractiure but something wrong with the ball of my foor    GERD (gastroesophageal reflux disease)     Knee swelling     Knee replacement 2018    Scoliosis 2004    Effects my neck     Past Surgical History:   Procedure Laterality Date    FEMUR FRACTURE SURGERY      MASTECTOMY      Left    REPAIR ANEURYSM / PSEUDO ANEURYSM / RUPTURED ANEURYSM POPLITEAL ARTERY      REPLACEMENT TOTAL KNEE Left     SHOULDER ARTHROSCOPY      SHOULDER SURGERY  2011    Repaced at [facility]     Family History   Problem Relation Age of Onset    Osteoporosis Mother       Social History     Socioeconomic History    Marital status:    Tobacco Use    Smoking status: Never     Passive exposure: Never    Smokeless tobacco: Never   Vaping Use    Vaping status: Never Used   Substance and Sexual Activity    Alcohol use: Yes     Alcohol/week: 1.0 standard drink of alcohol     Types: 1 Glasses of wine per week    Drug use: No    Sexual activity: Not Currently        Review of Systems   Constitutional: Negative.    HENT: Negative.     Eyes:  "Negative.    Respiratory: Negative.     Cardiovascular: Negative.    Gastrointestinal: Negative.    Endocrine: Negative.    Genitourinary: Negative.    Musculoskeletal:  Positive for arthralgias.   Skin: Negative.    Allergic/Immunologic: Negative.    Neurological: Negative.    Hematological: Negative.    Psychiatric/Behavioral: Negative.         The following portions of the patient's history were reviewed and updated as appropriate: allergies, current medications, past family history, past medical history, past social history, past surgical history, and problem list.    Physical Exam:   /62   Ht 165.1 cm (65\")   Wt 65.4 kg (144 lb 3.2 oz)   BMI 24.00 kg/m²     When I watched her walk barefoot she still has increased dorsiflexion of the left great toe.  Her EHL is overactive.  She cannot fully walk with the left foot dorsiflexed off the ground.  I think there is some subtle weakness on this side.  No change in sensation.  Medical Decision Making    Data Review:   none    Assessment/Plan/Diagnosis/Treatment Options:   1. Left foot pain  As above I think the great toe pain is due to walking with the EHL overactive.  I do think therapy will help and she wants to try that before considering a neurology evaluation.  I reprinted the therapy order and advised her to go to the therapist and they will provide the exercises.  I will see her again in 3 months.  I also suggested she try a sesamoid pad on that side to see if it gives her any improvement.  It did help the right side.  By elevating the first metatarsal a little bit that might take some of the stress off the EHL.  I do not think a custom orthotic would be helpful here because I think it would just take up more room in the shoe and make the great toe hurt more.  I will see her again in  - Ambulatory Referral to Physical Therapy for Evaluation & Treatment    2. Sesamoiditis of right foot  As above  - Ambulatory Referral to Physical Therapy for Evaluation & " Treatment          Geneva Goff MD